# Patient Record
Sex: FEMALE | Race: WHITE
[De-identification: names, ages, dates, MRNs, and addresses within clinical notes are randomized per-mention and may not be internally consistent; named-entity substitution may affect disease eponyms.]

---

## 2018-04-02 ENCOUNTER — HOSPITAL ENCOUNTER (OUTPATIENT)
Dept: HOSPITAL 45 - X.SURG | Age: 83
Discharge: HOME | End: 2018-04-02
Attending: OPHTHALMOLOGY
Payer: COMMERCIAL

## 2018-04-02 VITALS — HEIGHT: 60.98 IN | HEIGHT: 60.98 IN

## 2018-04-02 VITALS — HEART RATE: 91 BPM | OXYGEN SATURATION: 94 % | SYSTOLIC BLOOD PRESSURE: 121 MMHG | DIASTOLIC BLOOD PRESSURE: 78 MMHG

## 2018-04-02 VITALS — TEMPERATURE: 98.24 F

## 2018-04-02 DIAGNOSIS — Z82.49: ICD-10-CM

## 2018-04-02 DIAGNOSIS — I10: ICD-10-CM

## 2018-04-02 DIAGNOSIS — E07.9: ICD-10-CM

## 2018-04-02 DIAGNOSIS — Z90.710: ICD-10-CM

## 2018-04-02 DIAGNOSIS — E78.5: ICD-10-CM

## 2018-04-02 DIAGNOSIS — Z83.3: ICD-10-CM

## 2018-04-02 DIAGNOSIS — H25.11: Primary | ICD-10-CM

## 2018-04-02 DIAGNOSIS — K21.9: ICD-10-CM

## 2018-04-02 DIAGNOSIS — M19.90: ICD-10-CM

## 2018-04-02 RX ADMIN — CYCLOPENTOLATE HYDROCHLORIDE SCH DROP: 10 SOLUTION/ DROPS OPHTHALMIC at 07:37

## 2018-04-02 RX ADMIN — KETOROLAC TROMETHAMINE SCH DROP: 5 SOLUTION OPHTHALMIC at 07:38

## 2018-04-02 RX ADMIN — TROPICAMIDE SCH DROP: 10 SOLUTION/ DROPS OPHTHALMIC at 07:41

## 2018-04-02 RX ADMIN — KETOROLAC TROMETHAMINE SCH DROP: 5 SOLUTION OPHTHALMIC at 07:43

## 2018-04-02 RX ADMIN — MOXIFLOXACIN HYDROCHLORIDE SCH DROP: 5 SOLUTION/ DROPS OPHTHALMIC at 07:49

## 2018-04-02 RX ADMIN — MOXIFLOXACIN HYDROCHLORIDE SCH DROP: 5 SOLUTION/ DROPS OPHTHALMIC at 07:39

## 2018-04-02 RX ADMIN — PHENYLEPHRINE HYDROCHLORIDE SCH DROP: 25 SOLUTION/ DROPS OPHTHALMIC at 07:34

## 2018-04-02 RX ADMIN — CYCLOPENTOLATE HYDROCHLORIDE SCH DROP: 10 SOLUTION/ DROPS OPHTHALMIC at 07:42

## 2018-04-02 RX ADMIN — PHENYLEPHRINE HYDROCHLORIDE SCH DROP: 25 SOLUTION/ DROPS OPHTHALMIC at 07:40

## 2018-04-02 RX ADMIN — TROPICAMIDE SCH DROP: 10 SOLUTION/ DROPS OPHTHALMIC at 07:35

## 2018-04-02 NOTE — [facility] OPERATIVE REPORT
Operative Report


Operative Date


Apr 2, 2018.





Pre-Operative Diagnosis





Cataract Right Eye





Post-Operative Diagnosis





Same





Procedure(s) Performed





Right Cataract Phacoemulsification With Intraocular Lens Implant





Surgeon


Dr. Leiva





Assistant Surgeon(s)


none





Estimated Blood Loss


0ml





Findings


cataract right eye





Fluids


see anesthesia record





Specimens





none





Drains


None





Anesthesia Type


MAC





Complication(s)


none





Disposition


no


Recovery Room / PACU





Indications


decreased vision right eye





Description of Procedure


After informed consent was obtained in the holding area the patient was


wheeled back to the operating room where cardiac monitoring leads and oxygen


by nasal cannula was administered by Anesthesia. Gentle IV sedation was


given, and the patient's right eye was prepped and draped in usual sterile


fashion. A wire lid speculum was placed into the right eye and the operating


microscope was swung into position. Using 0.12 forceps and a Supersharp blade


a paracentesis port was made 2 o'clock hours away from the 9 o'clock position


of the patient's right eye. 1% non-preserved Lidocaine was then injected into


the anterior chamber for anesthesia.  A 2.0 mm keratotome blade was then used


to make a shelved clear corneal incision at the 9 o'clock position of the


right eye. Amvisc was injected into the anterior chamber and a cystotome and


Utrata forceps were used to perform a curvilinear capsulorrhexis. BSS on a


hydrodissection cannula was used to hydrodissect the lens nucleus away from


the capsular bag. The phacoemulsification handpiece was then used in a stop


and chop fashion to remove the lens nucleus. The irrigation and aspiration


handpiece was then used to remove the residual cortical material. Amvisc was


injected into the capsular bag and anterior chamber and a [employer] MX60


28.0 Diopter intraocular lens was injected into the capsular bag.  Irrigation


and aspiration handpiece was used to remove the residual viscoelastic


material. The wounds were hydrated and noted to be watertight.  The wire lid


speculum was removed from the eye.  Vigamox, Brimonidine, and TobraDex


ointment were placed on the eye and it was shielded.  It should be noted that


EndoCoat was used extensively during the case to protect the cornea endothelium.


 


DISPOSITION:  The patient tolerated the procedure well and was wheeled to the


post anesthesia care unit in stable condition.


I attest to the content of the Intraoperative Record and any orders documented 

therein.  Any exceptions are noted below.


I attest to the content of the Intraoperative Record and any orders documented 

therein.  Any exceptions are noted below.

## 2018-04-02 NOTE — DISCHARGE INSTRUCTIONS-[facility]
Discharge Instructions


Date of Service


Apr 2, 2018.





Visit


Reason for Visit:  Cataract Right Eye





Discharge


Discharge Diagnosis / Problem:  cataract right eye





Discharge Goals


Goal(s):  Improve function





Activity Recommendations


Activity Limitations:  per Instructions/Follow-up section


Lifting Limitations:  no more than 5 pounds





Anesthesia


.





Post Anesthesia Instructions:





If you have had General Anesthesia or IV Sedation:





*  Do not drive today.


*  Resume driving when surgeon permits.


*  Do not make important decisions or sign legal documents today.


*  Call surgeon for:





   1.  Temperature elevations greater than 101 degrees F.


   2.  Uncontrollable pain.


   3.  Excessive bleeding.


   4.  Persistent nausea and vomiting.


   5.  Medication intolerance (nausea, vomiting or rash).





*  For nausea and vomiting use only clear liquids such as: tea, soda, bouillon 

until nausea subsides, then gradually increase diet as tolerated.





*  If you have any concerns or questions, call your surgeon's office.  If 

physician is unavailable and it is an emergency, call 911 or go to the nearest 

emergency room.





.





Instructions / Follow-Up


Instructions / Follow-Up





ACTIVITY RECOMMENDATIONS:





*  Light activities





*  You may walk outside, read, watch television.





*  Mild irritation and blurred vision are common for the first few days, 

redness around the white part of the eye is common.








MEDICATIONS:





Resume previous medications unless instructed otherwise by your surgeon.





Eye drops (today and tomorrow):


   


   Gatifloxacin - one drop in operative eye every 2 hours while awake


   Prednisolone 1% - one drop in operative eye every 2 hours while awake


   Ketorolac - one drop in operative eye every 2 hours while awake


   





SPECIAL CARE INSTRUCTIONS:





*  If any problems or concerns, please call Dr. Leiva's office at [phone].





*  Keep plastic shield taped over eye to sleep at night.





*  Keep plastic shield taped over eye except to administer eye drops.





*  Keep plastic shield on until office visit the following day.








FOLLOW UP VISIT:





Follow-up with Dr. Leiva in the [place] office as scheduled.





If not already scheduled, please call the office at [phone].





Diet Recommendations


Home Diet:  resume previous diet





Procedures


Procedures Performed:  


Right Cataract Phacoemulsification With Intraocular Lens Implant





Pending Studies


Studies pending at discharge:  no





Medical Emergencies








.


Who to Call and When:





Medical Emergencies:  If at any time you feel your situation is an emergency, 

please call 911 immediately.





.





Non-Emergent Contact


Non-Emergency issues call your:  Ophthalmologist





.


.








"Provider Documentation" section prepared by Raman Leiva.








.

## 2018-04-02 NOTE — ANESTHESIA PROGRESS NT - [facility]
Anesthesia Post Op Note


Date & Time


Apr 2, 2018 at 09:14





Vital Signs


Pain Intensity:  0





Vital Signs Past 12 Hours








  Date Time  Temp Pulse Resp B/P (MAP) Pulse Ox O2 Delivery O2 Flow Rate FiO2


 


4/2/18 07:28 37.0 76 22 127/84 (98) 96 Room Air  











Notes


Mental Status:  alert / awake / arousable, participated in evaluation


Pt Amnestic to Procedure:  Yes


Nausea / Vomiting:  adequately controlled


Pain:  adequately controlled


Airway Patency, RR, SpO2:  stable & adequate


BP & HR:  stable & adequate


Hydration State:  stable & adequate


Anesthetic Complications:  no major complications apparent

## 2018-07-30 ENCOUNTER — HOSPITAL ENCOUNTER (OUTPATIENT)
Dept: HOSPITAL 45 - X.SURG | Age: 83
Discharge: HOME | End: 2018-07-30
Attending: OPHTHALMOLOGY
Payer: COMMERCIAL

## 2018-07-30 VITALS — OXYGEN SATURATION: 99 % | SYSTOLIC BLOOD PRESSURE: 127 MMHG | DIASTOLIC BLOOD PRESSURE: 79 MMHG | HEART RATE: 80 BPM

## 2018-07-30 VITALS — HEIGHT: 60.98 IN | HEIGHT: 60.98 IN

## 2018-07-30 VITALS — TEMPERATURE: 97.34 F

## 2018-07-30 DIAGNOSIS — H18.451: Primary | ICD-10-CM

## 2018-07-30 DIAGNOSIS — I10: ICD-10-CM

## 2018-07-30 DIAGNOSIS — K21.9: ICD-10-CM

## 2018-07-30 DIAGNOSIS — Z79.899: ICD-10-CM

## 2018-07-30 DIAGNOSIS — Z88.8: ICD-10-CM

## 2018-07-30 RX ADMIN — MOXIFLOXACIN HYDROCHLORIDE SCH DROP: 5 SOLUTION/ DROPS OPHTHALMIC at 11:20

## 2018-07-30 RX ADMIN — MOXIFLOXACIN HYDROCHLORIDE SCH DROP: 5 SOLUTION/ DROPS OPHTHALMIC at 11:13

## 2018-07-30 RX ADMIN — MOXIFLOXACIN HYDROCHLORIDE SCH DROP: 5 SOLUTION/ DROPS OPHTHALMIC at 11:00

## 2018-07-30 NOTE — OPERATIVE REPORT
DATE OF OPERATION:  07/30/2018

 

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

 

POSTOPERATIVE DIAGNOSIS:  Salzmann's nodular degeneration of the right

cornea.

 

PROCEDURE PERFORMED:  Lamellar keratectomy, right eye.

 

COMPLICATIONS:  None.

 

ESTIMATED BLOOD LOSS:  None.

 

ANESTHESIA:  Local with sedation.

 

DESCRIPTION OF PROCEDURE:  After informed consent was obtained in the holding

area, the patient was wheeled back to the operating room where cardiac

monitoring leads and oxygen by nasal cannula was administered by anesthesia. 

Gentle IV sedation was given.  The patient's right eye was prepped and draped

in usual sterile fashion.  Wire lid speculum was placed in the right eye and

the operating microscope swung into position.  Using 0.12 forceps and a 57

[place] blade, a lamellar keratectomy was carried out on the central, 8 mm of

the cornea at removing Salzmann's nodular layers from the corneal surface. 

Once this was done, a single bandage contact lens was placed on the eye.  The

wire lid speculum was removed from the eye and Vigamox was placed on the eye.

 The patient tolerated the procedure well and was taken to recovery area in

stable condition.

 

 

I attest to the content of the Intraoperative Record and any orders documented therein. Any exception
s are noted below.

## 2018-07-30 NOTE — ANESTHESIA PROGRESS NT - [facility]
Anesthesia Post Op Note


Date & Time


Jul 30, 2018 at 13:00





Vital Signs


Pain Intensity:  0





Vital Signs Past 12 Hours








  Date Time  Temp Pulse Resp B/P (MAP) Pulse Ox O2 Delivery O2 Flow Rate FiO2


 


7/30/18 12:34 36.3 74 16 102/62 (75) 98 Room Air  


 


7/30/18 10:49 36.7 81 22 150/91 (110) 98 Room Air  











Notes


Mental Status:  alert / awake / arousable, participated in evaluation


Pt Amnestic to Procedure:  Yes


Nausea / Vomiting:  adequately controlled


Pain:  adequately controlled


Airway Patency, RR, SpO2:  stable & adequate


BP & HR:  stable & adequate


Hydration State:  stable & adequate


Anesthetic Complications:  no major complications apparent

## 2018-07-30 NOTE — [facility] POST OPERATIVE BRIEF NOTE
Immediate Operative Summary


Operative Date


Jul 30, 2018.





Pre-Operative Diagnosis





Right Eye Nodular Corneal Degeneration





Post-Operative Diagnosis





same





Procedure(s) Performed





Right Eye Lamellar Keratectomy





Surgeon


Dr. ANDERSON Leiva





Assistant Surgeon(s)


0





Estimated Blood Loss


0





Findings


Consistent with Post-Op Diagnosis





Specimens





none





Drains


None





Anesthesia Type


MAC





Complication(s)


none





Disposition


Accompanied Pt To Recover:  no


Disposition:  Recovery Room / PACU

## 2018-07-30 NOTE — DISCHARGE INSTRUCTIONS-[facility]
Discharge Instructions


Date of Service


Jul 30, 2018.





Visit


Reason for Visit:  Right Eye Nodular Corneal Degeneration





Discharge


Discharge Diagnosis / Problem:  Stan's nodular corneal degeneration right eye





Discharge Goals


Goal(s):  Improve function





Activity Recommendations


Activity Limitations:  per Instructions/Follow-up section


Lifting Limitations:  none





Anesthesia


.





Post Anesthesia Instructions:





If you have had General Anesthesia or IV Sedation:





*  Do not drive today.


*  Resume driving when surgeon permits.


*  Do not make important decisions or sign legal documents today.


*  Call surgeon for:





   1.  Temperature elevations greater than 101 degrees F.


   2.  Uncontrollable pain.


   3.  Excessive bleeding.


   4.  Persistent nausea and vomiting.


   5.  Medication intolerance (nausea, vomiting or rash).





*  For nausea and vomiting use only clear liquids such as: tea, soda, bouillon 

until nausea subsides, then gradually increase diet as tolerated.





*  If you have any concerns or questions, call your surgeon's office.  If 

physician is unavailable and it is an emergency, call 911 or go to the nearest 

emergency room.





.





Instructions / Follow-Up


Instructions / Follow-Up





ACTIVITY RECOMMENDATIONS:





*  Light activities





*  You may walk outside, read, watch television.





*  Mild irritation and blurred vision are common for the first few days, 

redness around the white part of the eye is common.








MEDICATIONS:





Resume previous medications unless instructed otherwise by your surgeon.





Eye drops (today and tomorrow):


   


   Gatifloxacin - one drop in operative eye 4 times a day


   Prednisolone 1% - one drop in operative eye 4 times a day


   


SPECIAL CARE INSTRUCTIONS:





*  If any problems or concerns, please call Dr. Leiva's office at [phone].











FOLLOW UP VISIT:





Follow-up with Dr. Leiva in the [place] office as scheduled.





If not already scheduled, please call the office at [phone].





Diet Recommendations


Home Diet:  resume previous diet





Procedures


Procedures Performed:  


Right Eye Lamellar Keratectomy





Pending Studies


Studies pending at discharge:  no





Medical Emergencies








.


Who to Call and When:





Medical Emergencies:  If at any time you feel your situation is an emergency, 

please call 911 immediately.





.





Non-Emergent Contact


Non-Emergency issues call your:  Ophthalmologist





.


.








"Provider Documentation" section prepared by Raman Leiva.








.